# Patient Record
Sex: MALE | Race: NATIVE HAWAIIAN OR OTHER PACIFIC ISLANDER | NOT HISPANIC OR LATINO | Employment: FULL TIME | URBAN - METROPOLITAN AREA
[De-identification: names, ages, dates, MRNs, and addresses within clinical notes are randomized per-mention and may not be internally consistent; named-entity substitution may affect disease eponyms.]

---

## 2017-06-18 ENCOUNTER — OFFICE VISIT (OUTPATIENT)
Dept: URGENT CARE | Facility: PHYSICIAN GROUP | Age: 37
End: 2017-06-18
Payer: COMMERCIAL

## 2017-06-18 VITALS
WEIGHT: 185 LBS | HEART RATE: 100 BPM | HEIGHT: 68 IN | OXYGEN SATURATION: 100 % | SYSTOLIC BLOOD PRESSURE: 130 MMHG | TEMPERATURE: 98.1 F | RESPIRATION RATE: 18 BRPM | DIASTOLIC BLOOD PRESSURE: 72 MMHG | BODY MASS INDEX: 28.04 KG/M2

## 2017-06-18 DIAGNOSIS — J02.0 STREP PHARYNGITIS: ICD-10-CM

## 2017-06-18 LAB
INT CON NEG: NEGATIVE
INT CON POS: POSITIVE
S PYO AG THROAT QL: NORMAL

## 2017-06-18 PROCEDURE — 99204 OFFICE O/P NEW MOD 45 MIN: CPT | Performed by: PHYSICIAN ASSISTANT

## 2017-06-18 PROCEDURE — 87880 STREP A ASSAY W/OPTIC: CPT | Performed by: PHYSICIAN ASSISTANT

## 2017-06-18 RX ORDER — PREDNISONE 10 MG/1
TABLET ORAL
Qty: 6 TAB | Refills: 0 | Status: SHIPPED | OUTPATIENT
Start: 2017-06-18 | End: 2017-06-20

## 2017-06-18 RX ORDER — PENICILLIN V POTASSIUM 500 MG/1
500 TABLET ORAL 3 TIMES DAILY
Qty: 30 TAB | Refills: 0 | Status: SHIPPED | OUTPATIENT
Start: 2017-06-18 | End: 2017-06-28

## 2017-06-18 RX ORDER — VALACYCLOVIR HYDROCHLORIDE 500 MG/1
500 TABLET, FILM COATED ORAL 2 TIMES DAILY
COMMUNITY

## 2017-06-18 NOTE — MR AVS SNAPSHOT
"Chaim Gao   2017 11:15 AM   Office Visit   MRN: [id number]    Department:  [facility]   Dept Phone:  [phone]    Description:  Male : 1980   Provider:  Erik Bains PA-C           Reason for Visit     Sore Throat sore throat, fevers, bodyaches x1 day      Allergies as of 2017     No Known Allergies      You were diagnosed with     Strep pharyngitis   [[id number]]         Vital Signs     Blood Pressure Pulse Temperature Respirations Height Weight    130/72 mmHg 100 36.7 °C (98.1 °F) 18 1.727 m (5' 7.99\") 83.915 kg (185 lb)    Body Mass Index Oxygen Saturation Smoking Status             28.14 kg/m2 100% Never Smoker          Basic Information     Date Of Birth Sex Race Ethnicity Preferred Language    1980 Male  or other  Non- English      Health Maintenance     Patient has no pending health maintenance at this time      Results     POCT Rapid Strep A      Component    Rapid Strep Screen    POS    Internal Control Positive    Positive    Internal Control Negative    Negative                        Current Immunizations     No immunizations on file.      Below and/or attached are the medications your provider expects you to take. Review all of your home medications and newly ordered medications with your provider and/or pharmacist. Follow medication instructions as directed by your provider and/or pharmacist. Please keep your medication list with you and share with your provider. Update the information when medications are discontinued, doses are changed, or new medications (including over-the-counter products) are added; and carry medication information at all times in the event of emergency situations     Allergies:  No Known Allergies          Medications  Valid as of: 2017 - 12:22 PM    Generic Name Brand Name Tablet Size Instructions for use    Fluticasone-Salmeterol (AEROSOL POWDER, BREATH ACTIVATED) ADVAIR 100-50 " MCG/DOSE Inhale 1 Puff by mouth every 12 hours.        Penicillin V Potassium (Tab) VEETID 500 MG Take 1 Tab by mouth 3 times a day for 10 days.        PredniSONE (Tab) DELTASONE 10 MG 2 tabs PO daily x 3 days        ValACYclovir HCl (Tab) VALTREX 500 MG Take 500 mg by mouth 2 times a day.        .                 Medicines prescribed today were sent to:     [facility] [id number] [place] [address], [address] [facility] [address]    Phone: [phone] Fax: [phone]    Open 24 Hours?: No      Medication refill instructions:       If your prescription bottle indicates you have medication refills left, it is not necessary to call your provider’s office. Please contact your pharmacy and they will refill your medication.    If your prescription bottle indicates you do not have any refills left, you may request refills at any time through one of the following ways: The online [employer] system (except Urgent Care), by calling your provider’s office, or by asking your pharmacy to contact your provider’s office with a refill request. Medication refills are processed only during regular business hours and may not be available until the next business day. Your provider may request additional information or to have a follow-up visit with you prior to refilling your medication.   *Please Note: Medication refills are assigned a new Rx number when refilled electronically. Your pharmacy may indicate that no refills were authorized even though a new prescription for the same medication is available at the pharmacy. Please request the medicine by name with the pharmacy before contacting your provider for a refill.           [employer] Access Code: [email or web address]  Expires: 7/18/2017 12:22 PM    [employer]  A secure, online tool to manage your health information     [employer]’s M-Dot Network® is a secure, online tool that connects you to your personalized health information  from the privacy of your home -- day or night - making it very easy for you to manage your healthcare. Once the activation process is completed, you can even access your medical information using the [employer] carito, which is available for free in the Apple Carito store or Google Play store.     [employer] provides the following levels of access (as shown below):   My Chart Features   [facility] Doctor [facility]  Specialists [facility]  Urgent  Care [facility]  Primary Care  Doctor   Email your healthcare team securely and privately 24/7 X X X    Manage appointments: schedule your next appointment; view details of past/upcoming appointments X      Request prescription refills. X      View recent personal medical records, including lab and immunizations X X X X   View health record, including health history, allergies, medications X X X X   Read reports about your outpatient visits, procedures, consult and ER notes X X X X   See your discharge summary, which is a recap of your hospital and/or ER visit that includes your diagnosis, lab results, and care plan. X X       How to register for [employer]:  1. Go to  [email or web address].  2. Click on the Sign Up Now box, which takes you to the New Member Sign Up page. You will need to provide the following information:  a. Enter your [employer] Access Code exactly as it appears at the top of this page. (You will not need to use this code after you’ve completed the sign-up process. If you do not sign up before the expiration date, you must request a new code.)   b. Enter your date of birth.   c. Enter your home email address.   d. Click Submit, and follow the next screen’s instructions.  3. Create a [employer] ID. This will be your [employer] login ID and cannot be changed, so think of one that is secure and easy to remember.  4. Create a [employer] password. You can change your password at any time.  5. Enter your Password Reset Question and Answer. This can be used at a later time if you  forget your password.   6. Enter your e-mail address. This allows you to receive e-mail notifications when new information is available in [employer].  7. Click Sign Up. You can now view your health information.    For assistance activating your [employer] account, call [phone]

## 2017-06-18 NOTE — PROGRESS NOTES
"Subjective:      Chaim Gao is a 36 y.o. male who presents with Sore Throat            HPI  Chief Complaint   Patient presents with   • Sore Throat     sore throat, fevers, bodyaches x1 day       HPI:  Chaim Gao is a 36 y.o. male who presents with sore throat and fever x 1 day.  On trip from [place] here for 7 more days. Hasn't tried any over-the-counter medications. Arrived from [place] 4 days ago. Presents with wife. No other sick contacts. Is in [place] for a baseball tournament for his children. Patient denies HA, SOB, chest pain, palpitations, or n/v/d. He is having pain with swallowing. Subjective fevers last night.      No past medical history on file.    No past surgical history on file.    No family history on file.    Social History     Social History   • Marital Status:      Spouse Name: N/A   • Number of Children: N/A   • Years of Education: N/A     Occupational History   • Not on file.     Social History Main Topics   • Smoking status: Never Smoker    • Smokeless tobacco: Not on file   • Alcohol Use: Not on file   • Drug Use: Not on file   • Sexual Activity: Not on file     Other Topics Concern   • Not on file     Social History Narrative   • No narrative on file         Current outpatient prescriptions:   •  valacyclovir, 500 mg, Oral, BID, 6/18/2017  •  fluticasone-salmeterol, 1 Puff, Inhalation, Q12HRS, 6/18/2017    No Known Allergies        ROS       Objective:     /72 mmHg  Pulse 100  Temp(Src) 36.7 °C (98.1 °F)  Resp 18  Ht 1.727 m (5' 7.99\")  Wt 83.915 kg (185 lb)  BMI 28.14 kg/m2  SpO2 100%     Physical Exam       Nursing note and Vitals Reviewed.    Constitutional:   Appropriately groomed, pleasant affect, well nourished, in NAD.    Head:   Normocephalic, atraumatic.    Eyes:   PERRLA, EOM's full, sclera white, conjunctiva not erythematous, and medial canthus without exudate bilaterally.    Ears:  Auricle and tragus non-tender to manipulation.  No pre-auricular " lymphadenopathy or mastoid ttp.  EACs with mild cerumen bilaterally, not erythematous.  TM’s pearly gray with cone of light present and umbo and malleolus visible bilaterally.  No bulging or fluid bubbles present in middle ear.  Hearing grossly intact to voice.    Nose:  Nares patent bilaterally.  Nasal mucosa edematous with white rhinorrhea bilaterally. Sinuses not tender to percussion.    Throat:  Dentition wnl, mucosa moist without lesions.  Oropharynx significantly erythematous, with enlargement of the palatine tonsils bilaterally with exudates.    Mild post nasal drainage present.  Soft palate rises symmetrically bilaterally and uvula midline.      Neck: Neck supple, with moderate anterior lymphadenopathy that is soft and mobile to palpation. Thyroid non-palpable without tenderness or nodules. No supraclavicular lymphadenopathy.    Lungs:  Respiratory effort not labored without accessory muscle use.  Lungs clear to auscultation bilaterally without wheezes, rales, or rhonchi.    Heart:  RRR, without murmurs rubs or gallops.  Radial and dorsalis pedis pulse 2+ bilaterally.  No LE edema.    Musculoskeletal:  Gait non-antalgic with a narrow base.    Derm:  Skin without rashes or lesions with good turgor pressure.      Psychiatric:  Mood, affect, and judgement appropriate.        Assessment/Plan:     1. Strep pharyngitis  POCT Rapid Strep A    penicillin v potassium (VEETID) 500 MG Tab    predniSONE (DELTASONE) 10 MG Tab    patient presents with sore throat for one day. Recently arrived in [place] 4 days ago from [place] were baseball tournament for his son. No other sick contacts. Eye exam patient is afebrile and visibly sick but not acute appearing. Oropharynx significantly erythematous with enlargement of the tonsils bilaterally with exudates present. Prescribed penicillin VK and prednisone pulse help with swelling. Recommended lots of fluids.    Patient was in agreement with this treatment plan and seemed to  understand without barriers. All questions were encouraged and answered.  Reviewed signs and symptoms of when to seek emergency medical care.     Please note that this dictation was created using voice recognition software.  I have made every reasonable attempt to correct obvious errors, but I expect there are errors of shakira and possibly content that I did not discover before finalizing the note.